# Patient Record
Sex: MALE | Race: WHITE | NOT HISPANIC OR LATINO
[De-identification: names, ages, dates, MRNs, and addresses within clinical notes are randomized per-mention and may not be internally consistent; named-entity substitution may affect disease eponyms.]

---

## 2021-06-14 PROBLEM — Z00.00 ENCOUNTER FOR PREVENTIVE HEALTH EXAMINATION: Status: ACTIVE | Noted: 2021-06-14

## 2021-06-15 ENCOUNTER — NON-APPOINTMENT (OUTPATIENT)
Age: 51
End: 2021-06-15

## 2021-06-23 ENCOUNTER — NON-APPOINTMENT (OUTPATIENT)
Age: 51
End: 2021-06-23

## 2021-06-29 ENCOUNTER — APPOINTMENT (OUTPATIENT)
Dept: COLORECTAL SURGERY | Facility: CLINIC | Age: 51
End: 2021-06-29
Payer: COMMERCIAL

## 2021-06-29 VITALS
HEIGHT: 73 IN | SYSTOLIC BLOOD PRESSURE: 115 MMHG | WEIGHT: 163 LBS | DIASTOLIC BLOOD PRESSURE: 75 MMHG | HEART RATE: 55 BPM | BODY MASS INDEX: 21.6 KG/M2 | TEMPERATURE: 98.7 F

## 2021-06-29 PROCEDURE — 99072 ADDL SUPL MATRL&STAF TM PHE: CPT

## 2021-06-29 PROCEDURE — 46221 LIGATION OF HEMORRHOID(S): CPT

## 2021-06-29 PROCEDURE — 99203 OFFICE O/P NEW LOW 30 MIN: CPT | Mod: 25

## 2021-06-29 NOTE — PROCEDURE
[FreeTextEntry1] : Rubber Band Ligation\par \par Pre-procedure Diagnosis: Symptomatic Internal Hemorrhoids\par Post-procedure Diagnosis: Symptomatic Internal Hemorrhoids\par Procedure: Anoscopy with Rubber Band Ligation\par Anesthesia: none\par Estimated blood loss: minimal\par Specimen: none\par Drain: none\par Complications: none\par \par Indications: Patient presents with history of symptoms related to internal hemorrhoids. On physical exam, large internal hemorrhoids were detected. Risks/benefits/alternative were discussed and patient agreed to proceed with office based procedure.\par \par Procedure Details: Consent obtained. Patient was placed in a modified prone gamal-knife position on the examination table. Digital rectal exam was performed with an index finger with surgical jelly lubricant. An anoscope was inserted into the anal canal, and a prominent hemorrhoidal bundle was identified in the left lateral position. Using the hemorrhoid ligation device, a rubber band was placed around this hemorrhoid. No active bleeding was noted. The anoscope was removed. The patient tolerated the procedure well.\par \par

## 2021-06-29 NOTE — HISTORY OF PRESENT ILLNESS
[FreeTextEntry1] : 49yo male presents for initial evaluation - reason for visit "hemorrhoids"\par Referred by GI Dr Hernandez for evaluation of irregular appearing tissue on recent exam\par hx of painless rectal bleeding in 2017, underwent colonoscopy and advised had hemorrhoids. Bleeding resolved on own\par Bleeding returned 1/2021 w/o aggravating factor. Painless rectal bleeding continued for the last few months w/ most BMs that fills the toilet bowl and has soaked through clothing in the past.\par c/o sensation that something is present in the rectum when he defecates and has "swollen balloon that's inflated" that eventually reduces after a few hours. Pt notices BRB also associated w/ clear/brownish liquid.\par Denies pain, itching or burning\par Has not tried sitz baths\par \par Pt presented to ER [facility] early May, advised had bleeding hemorrhoids and recommended repeat colonoscopy. Pt was discharged w/ 3 day HC suppository.\par \par Colonoscopy performed 5/28/2021 (prior colonoscopy in 2017, 2014, 2009):\par Localized erythema and ulceration were noted in the TI. These findings are c/w ileitis. Multiple biopsies taken\par Single sessile 1.2 cm non-bleeding polyp of benign appearance was found in the distal rectum, s/p polypectomy.\par Hypertrophied anal papillae noted. Loose tissue taken for pathology. Multiple biopsies taken. \par Medium non-bleeding Grade III hemorrhoids were noted\par Pathology:\par TI:Small mucosa WNL\par Rectum: Colonic mucosa w/ focal crypt hyperplastic change.Benign lymphoid aggregates noted\par Anal canal: fecal material, no mucosa identified\par \par BH: 1-2 times daily, possibly strains\par Denies constipation or diarrhea\par Reports adequate dietary fiber, although admits to eating limited produce. Drinks 1 gallon water daily\par Denies fiber supplementation, stool softeners or probiotics\par Takes Calcium and Potassium citrate daily\par \par Denies hx of anal warts or anal receptive sex\par (+)Family history of colon cancer in mother (diagnosed age 49) and brother (diagnosed age 48)\par As per patient, was diagnosed w/ Crohn's based on initial colonoscopy in 2009, however never treated for IBD. Last colonoscopies have been otherwise normal

## 2021-06-29 NOTE — HISTORY OF PRESENT ILLNESS
[FreeTextEntry1] : 51yo male presents for initial evaluation - reason for visit "hemorrhoids"\par Referred by GI Dr Hernandez for evaluation of irregular appearing tissue on recent exam\par hx of painless rectal bleeding in 2017, underwent colonoscopy and advised had hemorrhoids. Bleeding resolved on own\par Bleeding returned 1/2021 w/o aggravating factor. Painless rectal bleeding continued for the last few months w/ most BMs that fills the toilet bowl and has soaked through clothing in the past.\par c/o sensation that something is present in the rectum when he defecates and has "swollen balloon that's inflated" that eventually reduces after a few hours. Pt notices BRB also associated w/ clear/brownish liquid.\par Denies pain, itching or burning\par Has not tried sitz baths\par \par Pt presented to ER [facility] early May, advised had bleeding hemorrhoids and recommended repeat colonoscopy. Pt was discharged w/ 3 day HC suppository.\par \par Colonoscopy performed 5/28/2021 (prior colonoscopy in 2017, 2014, 2009):\par Localized erythema and ulceration were noted in the TI. These findings are c/w ileitis. Multiple biopsies taken\par Single sessile 1.2 cm non-bleeding polyp of benign appearance was found in the distal rectum, s/p polypectomy.\par Hypertrophied anal papillae noted. Loose tissue taken for pathology. Multiple biopsies taken. \par Medium non-bleeding Grade III hemorrhoids were noted\par Pathology:\par TI:Small mucosa WNL\par Rectum: Colonic mucosa w/ focal crypt hyperplastic change.Benign lymphoid aggregates noted\par Anal canal: fecal material, no mucosa identified\par \par BH: 1-2 times daily, possibly strains\par Denies constipation or diarrhea\par Reports adequate dietary fiber, although admits to eating limited produce. Drinks 1 gallon water daily\par Denies fiber supplementation, stool softeners or probiotics\par Takes Calcium and Potassium citrate daily\par \par Denies hx of anal warts or anal receptive sex\par (+)Family history of colon cancer in mother (diagnosed age 49) and brother (diagnosed age 48)\par As per patient, was diagnosed w/ Crohn's based on initial colonoscopy in 2009, however never treated for IBD. Last colonoscopies have been otherwise normal

## 2021-06-29 NOTE — PHYSICAL EXAM
[FreeTextEntry1] : Medical assistant present for duration of physical examination\par \par General no acute distress, alert and oriented\par Psych calm, pleasant demeanor, responding appropriately to questions\par Nonlabored breathing\par Ambulating without assistance\par Skin normal color and pigment, no visible lesions or rashes\par \par Anorectal Exam:\par Inspection no erythema, induration or fluctuance, no skin excoriation, no fissure, no external hemorrhoidal swelling or thrombosis\par BENITA nontender, no masses palpated, no blood on gloved finger\par \par Procedure: Anoscopy\par \par Pre procedure Diagnosis: hemorrhoids\par Post procedure Diagnosis: hemorrhoids\par Anesthesia: none\par Estimated blood loss: none\par Specimen: none\par Complications: none\par \par Consent obtained. Anoscopy was performed by passing a lighted anoscope with lubricant jelly into the anal canal and the entire anal mucosal surface was inspected. Findings included no fissure, large grade 3 internal hemorrhoids with overlying chronic appearing mucosal changes consistent with hemorrhoidal prolapse, no visible masses or lesions\par \par Patient tolerated examination and procedure well.\par \par \par

## 2021-06-29 NOTE — ASSESSMENT
[FreeTextEntry1] : Exam findings and diagnosis were discussed at length with patient. \par Recommendations including increased fiber intake, adequate daily hydration, stool softeners as needed, and sitz baths as needed and after bowel movements were discussed.\par Avoid constipation and diarrhea, avoid pushing/straining.\par Medical management, such as hydrocortisone cream/suppositories, was discussed.\par Office based treatment, such as rubber band ligation, was discussed as an alternative if symptoms persist despite conservative management.\par Role of hemorrhoidectomy also discussed.\par Office procedure and surgical risks and benefits discussed.\par \par Recommend rubber band ligation. Patient agreeable and understands multiple treatments may be performed for multiple hemorrhoids.\par First ligation performed today - left lateral\par Post-procedure instructions were reviewed with the patient, including recommendation to notify office immediately for any symptoms of severe pain, bleeding, inability to urinate, fever, or any other concern. All questions answered.\par \par F/u in 3 weeks or sooner as needed.\par All questions answered, patient expressed understanding and is agreeable to this plan.\par

## 2021-07-20 ENCOUNTER — APPOINTMENT (OUTPATIENT)
Dept: COLORECTAL SURGERY | Facility: CLINIC | Age: 51
End: 2021-07-20
Payer: COMMERCIAL

## 2021-07-20 VITALS
WEIGHT: 163 LBS | HEART RATE: 64 BPM | TEMPERATURE: 98.7 F | OXYGEN SATURATION: 95 % | DIASTOLIC BLOOD PRESSURE: 77 MMHG | HEIGHT: 73 IN | SYSTOLIC BLOOD PRESSURE: 121 MMHG | BODY MASS INDEX: 21.6 KG/M2

## 2021-07-20 DIAGNOSIS — Z72.89 OTHER PROBLEMS RELATED TO LIFESTYLE: ICD-10-CM

## 2021-07-20 DIAGNOSIS — Z78.9 OTHER SPECIFIED HEALTH STATUS: ICD-10-CM

## 2021-07-20 DIAGNOSIS — K64.8 OTHER HEMORRHOIDS: ICD-10-CM

## 2021-07-20 PROCEDURE — 46221 LIGATION OF HEMORRHOID(S): CPT

## 2021-07-20 PROCEDURE — 99072 ADDL SUPL MATRL&STAF TM PHE: CPT

## 2021-07-20 NOTE — PROCEDURE
[FreeTextEntry1] : Rubber Band Ligation\par \par Pre-procedure Diagnosis: Symptomatic Internal Hemorrhoids\par Post-procedure Diagnosis: Symptomatic Internal Hemorrhoids\par Procedure: Anoscopy with Rubber Band Ligation\par Anesthesia: none\par Estimated blood loss: minimal\par Specimen: none\par Drain: none\par Complications: none\par \par \par Procedure Details: Consent obtained. Patient was placed in a modified prone gamal-knife position on the examination table. Digital rectal exam was performed with an index finger with surgical jelly lubricant. An anoscope was inserted into the anal canal, and a prominent hemorrhoidal bundle was identified in the right posterior position. Using the hemorrhoid ligation device, a rubber band was placed around this hemorrhoid. No active bleeding was noted. The anoscope was removed. The patient tolerated the procedure well.\par \par \par

## 2021-07-20 NOTE — HISTORY OF PRESENT ILLNESS
[FreeTextEntry1] : 52 yo M presents for f/u hemorrhoids\par \par Last seen in the office on 6/29/21. Large grade III internal hemorrhoids with overlying chronic appearing mucosal changes consistent with hemorrhoidal prolapse noted. RBL of LL hemorrhoid performed\par \par Pt reports he tolerated RBL and didn't require use of OTC pain medications\par He reports bleeding has dramatically reduced and now only sees scant BRB on TP\par He still has some prolapsing tissue, however the amount of tissue is less and it reduces on its own more quickly\par Denies pain, itching\par BH: 1-2 times per day, denies straining\par Reports adequate dietary fiber, although admits to eating limited produce. Drinks 1 gallon water daily\par Denies fiber supplementation, stool softeners or probiotics\par Takes Calcium and Potassium citrate daily\par \par Last colonoscopy completed 5/28/21\par - Localized erythema and ulceration were noted in the TI. These findings are c/w ileitis. Multiple biopsies taken\par - Single sessile 1.2 cm non-bleeding polyp of benign appearance was found in the distal rectum, s/p polypectomy.\par - Hypertrophied anal papillae noted. Loose tissue taken for pathology. Multiple biopsies taken. \par - Medium non-bleeding Grade III hemorrhoids were noted\par \par Pathology:\par TI:Small mucosa WNL\par Rectum: Colonic mucosa w/ focal crypt hyperplastic change.Benign lymphoid aggregates noted\par Anal canal: fecal material, no mucosa identified\par \par FMH of colon cancer in mother (diagnosed age 49) and brother (diagnosed age 48).\par Denies ASA/NSAID use in the last week

## 2021-07-20 NOTE — PHYSICAL EXAM
[FreeTextEntry1] : Medical assistant present for duration of physical examination\par \par General no acute distress, alert and oriented\par Psych calm, pleasant \par Nonlabored breathing\par Ambulating without assistance\par Skin normal color and pigment, no visible lesions or rashes\par \par Anorectal Exam:\par Inspection no erythema, induration or fluctuance, no skin excoriation, no fissure, no external hemorrhoidal swelling or thrombosis\par BENITA nontender, no masses palpated, no blood on gloved finger\par \par Procedure: Anoscopy\par \par Pre procedure Diagnosis: hemorrhoids\par Post procedure Diagnosis: hemorrhoids\par Anesthesia: none\par Estimated blood loss: none\par Specimen: none\par Complications: none\par \par Consent obtained. Anoscopy was performed by passing a lighted anoscope with lubricant jelly into the anal canal and the entire anal mucosal surface was inspected. Findings included no fissure, excellent response to left lateral ligation with post ligation scar, large grade 3 right posterior hemorrhoid persist, right anterior hemorrhoid with mild inflammation\par \par Patient tolerated examination and procedure well.\par \par \par

## 2021-07-20 NOTE — ASSESSMENT
[FreeTextEntry1] : Exam findings were discussed at length with patient.\par Hemorrhoid symptoms improving significantly but not fully resolved since initiation of rubber banding.\par 2nd treatment performed today - right posterior \par Post-procedure instructions were reviewed with the patient, including recommendation to notify office immediately for any symptoms of severe pain, bleeding, inability to urinate, fever, or any other concern.\par Continue bowel regimen, sitz baths, and hydrocortisone prn\par F/u in 3 weeks if symptoms return or persist\par All questions were answered, patient expressed understanding, and is agreeable to this plan.\par